# Patient Record
Sex: MALE | URBAN - METROPOLITAN AREA
[De-identification: names, ages, dates, MRNs, and addresses within clinical notes are randomized per-mention and may not be internally consistent; named-entity substitution may affect disease eponyms.]

---

## 2021-01-20 ENCOUNTER — EMERGENCY (EMERGENCY)
Facility: HOSPITAL | Age: 46
LOS: 0 days | Discharge: HOME | End: 2021-01-20
Attending: EMERGENCY MEDICINE | Admitting: EMERGENCY MEDICINE
Payer: OTHER MISCELLANEOUS

## 2021-01-20 VITALS
HEIGHT: 74 IN | RESPIRATION RATE: 20 BRPM | HEART RATE: 78 BPM | DIASTOLIC BLOOD PRESSURE: 102 MMHG | TEMPERATURE: 98 F | OXYGEN SATURATION: 98 % | SYSTOLIC BLOOD PRESSURE: 166 MMHG | WEIGHT: 220.02 LBS

## 2021-01-20 VITALS — TEMPERATURE: 98 F
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
## 2021-01-20 DIAGNOSIS — Z98.890 OTHER SPECIFIED POSTPROCEDURAL STATES: Chronic | ICD-10-CM

## 2021-01-20 DIAGNOSIS — I25.10 ATHEROSCLEROTIC HEART DISEASE OF NATIVE CORONARY ARTERY WITHOUT ANGINA PECTORIS: ICD-10-CM

## 2021-01-20 DIAGNOSIS — S09.90XA UNSPECIFIED INJURY OF HEAD, INITIAL ENCOUNTER: ICD-10-CM
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
## 2021-01-20 DIAGNOSIS — Z91.013 ALLERGY TO SEAFOOD: ICD-10-CM

## 2021-01-20 DIAGNOSIS — R06.02 SHORTNESS OF BREATH: ICD-10-CM

## 2021-01-20 DIAGNOSIS — S00.01XA ABRASION OF SCALP, INITIAL ENCOUNTER: ICD-10-CM

## 2021-01-20 DIAGNOSIS — I10 ESSENTIAL (PRIMARY) HYPERTENSION: ICD-10-CM

## 2021-01-20 DIAGNOSIS — Z23 ENCOUNTER FOR IMMUNIZATION: ICD-10-CM
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
## 2021-01-20 DIAGNOSIS — E78.00 PURE HYPERCHOLESTEROLEMIA, UNSPECIFIED: ICD-10-CM

## 2021-01-20 DIAGNOSIS — E11.9 TYPE 2 DIABETES MELLITUS WITHOUT COMPLICATIONS: ICD-10-CM

## 2021-01-20 PROCEDURE — 70450 CT HEAD/BRAIN W/O DYE: CPT | Mod: 26

## 2021-01-20 PROCEDURE — 99284 EMERGENCY DEPT VISIT MOD MDM: CPT

## 2021-01-20 RX ORDER — ONDANSETRON 8 MG/1
4 TABLET, FILM COATED ORAL ONCE
Refills: 0 | Status: DISCONTINUED | OUTPATIENT
Start: 2021-01-20 | End: 2021-01-20

## 2021-01-20 RX ORDER — ACETAMINOPHEN 500 MG
975 TABLET ORAL ONCE
Refills: 0 | Status: COMPLETED | OUTPATIENT
Start: 2021-01-20 | End: 2021-01-20

## 2021-01-20 RX ORDER — ONDANSETRON 8 MG/1
4 TABLET, FILM COATED ORAL ONCE
Refills: 0 | Status: COMPLETED | OUTPATIENT
Start: 2021-01-20 | End: 2021-01-20

## 2021-01-20 RX ORDER — TETANUS TOXOID, REDUCED DIPHTHERIA TOXOID AND ACELLULAR PERTUSSIS VACCINE, ADSORBED 5; 2.5; 8; 8; 2.5 [IU]/.5ML; [IU]/.5ML; UG/.5ML; UG/.5ML; UG/.5ML
0.5 SUSPENSION INTRAMUSCULAR ONCE
Refills: 0 | Status: COMPLETED | OUTPATIENT
Start: 2021-01-20 | End: 2021-01-20

## 2021-01-20 RX ADMIN — ONDANSETRON 4 MILLIGRAM(S): 8 TABLET, FILM COATED ORAL at 10:51

## 2021-01-20 RX ADMIN — Medication 975 MILLIGRAM(S): at 10:51

## 2021-01-20 RX ADMIN — TETANUS TOXOID, REDUCED DIPHTHERIA TOXOID AND ACELLULAR PERTUSSIS VACCINE, ADSORBED 0.5 MILLILITER(S): 5; 2.5; 8; 8; 2.5 SUSPENSION INTRAMUSCULAR at 11:09

## 2021-01-20 NOTE — ED PROVIDER NOTE - ATTENDING CONTRIBUTION TO CARE
head injury on antiplatelets/aspirin, occurred at 8 am no loc mild nausea but no vomiting, no change in mental status. exam shows abrasion to scalp. cn 2-12 intact, gait nml, finger to nose.   plan is to obtain ct head and give zofran.

## 2021-01-20 NOTE — ED PROVIDER NOTE - PATIENT PORTAL LINK FT
You can access the FollowMyHealth Patient Portal offered by [facility] by registering at the following website: [email or web address]. By joining [employer]’s FollowMyHealth portal, you will also be able to view your health information using other applications (apps) compatible with our system.

## 2021-01-20 NOTE — ED PROVIDER NOTE - NS ED ROS FT
Review of Systems    Constitutional: (-) fever  Eyes/ENT: (-) blurry vision, (-) epistaxis  Cardiovascular: (-) chest pain, (-) syncope  Respiratory: (-) cough, (+) shortness of breath  Gastrointestinal: (-) vomiting, (-) diarrhea  Musculoskeletal: (-) neck pain, (-) back pain, (-) joint pain  Integumentary: (-) rash, (-) edema  Neurological: (-) headache, (-) altered mental status  Psychiatric: (-) hallucinations  Allergic/Immunologic: (-) pruritus

## 2021-01-20 NOTE — ED PROVIDER NOTE - NSFOLLOWUPCLINICS_GEN_ALL_ED_FT
[facility] Concussion Program  Concussion Program  [address] [place] [address]   Phone: [phone]  Fax:   Follow Up Time: 4-6 Days

## 2021-01-20 NOTE — ED PROVIDER NOTE - CARE PLAN
Principal Discharge DX:	Closed head injury, initial encounter  Secondary Diagnosis:	Nausea  Secondary Diagnosis:	Tetanus-diphtheria (Td) vaccination

## 2021-01-20 NOTE — ED PROVIDER NOTE - PHYSICAL EXAMINATION
Gen: Alert, NAD, well appearing  Head: NC, +mild swelling to head at site of injury, PERRL, EOMI, normal lids/conjunctiva  ENT: normal hearing, patent oropharynx without erythema/exudate, uvula midline  Neck: +supple, no midline tenderness  Pulm: Bilateral BS, normal resp effort, no wheeze/stridor/retractions  CV: RRR  Abd: soft, NT/ND  Skin: +abrasion to mid head at site of injury  Neuro: AAOx3, no sensory/motor deficits, GCS 15

## 2021-01-20 NOTE — ED PROVIDER NOTE - OBJECTIVE STATEMENT
Patient is a 45 years old M pmhx DM, HTN, Hypercholesterolemia, CAD w/ 1 stent presents to the ED for evaluation of head injury.  As per patient at around 8am this morning getting up and hit his head on a metal beam, no loc but admits to nausea and feeling dizzy.  No neck pain, no cp, no abd pain, hx of covid with now chronic shortness of breath but not new as of today. Pt is on ASA 81mg po daily.

## 2021-01-20 NOTE — ED PROVIDER NOTE - CLINICAL SUMMARY MEDICAL DECISION MAKING FREE TEXT BOX
PULMONARY FOLLOW UP      CHIEF COMPLAINT:     Chief Complaint   Patient presents with   • COPD     f/u copd- post CT        HISTORY OF PRESENT ILLNESS:     Marques Wills is a 65 year old male with a history of COPD, HTN, and hx of ptx who presents for further evaluation.  Doing well. Breathing has been stable. Has not requiring ED/UC visits. Using his Advair as prescribed.Not requiring his albuterol daily and has to use it rarely    Since my last visit with the patient -     []  YES    [x]  NO   cough:  []  YES    [x]  NO   purulent sputum:  []  YES    [x]  NO   hemoptysis:  []  YES    [x]  NO   Wheezing:  []  YES    [x]  NO   rhinorrhea/post nasal drip:  [x]  YES    []  NO   shortness of breath:   [x]  YES    []  NO   dyspnea on exertion:  []  YES    [x]  NO   chest pain:  []  YES    [x]  NO   pleurisy:  []  YES    [x]  NO   orthopnea:  []  YES    [x]  NO   paroxysmal nocturnal dyspnea:  []  YES    [x]  NO   lower extremity edema  []  YES    [x]  NO   fever:  []  YES    [x]  NO   chills:  []  YES    [x]  NO   night sweats:  []  YES    [x]  NO   GERD:  []  YES    [x]  NO   weight loss:  []  YES    [x]  NO   other:        Compliance:     Medications  [x]  YES    []  NO   Other:     REVIEW OF SYSTEMS:     The remainder of a 12 point review of systems was performed in clinic and is negative.     Patient's past medical history, past surgical history and social history were reviewed and are unchanged.     ALLERGIES:   Allergen Reactions   • Penicillins      rash        Current Outpatient Medications   Medication Sig   • Advair Diskus 250-50 MCG/DOSE inhaler Inhale 1 puff into the lungs 2 times daily.   • lisinopril (ZESTRIL) 5 MG tablet Take 1 tablet by mouth daily.   • albuterol 108 (90 Base) MCG/ACT inhaler Inhale 2 puffs into the lungs every 4 hours as needed for Shortness of Breath or Wheezing.   • atorvastatin (LIPITOR) 10 MG tablet Take 1 tablet by mouth daily.   • albuterol (VENTOLIN) (2.5 MG/3ML) 0.083%  nebulizer solution Take 3 mLs by nebulization every 4 hours as needed for Wheezing or Shortness of Breath. J44.9 COPD   • VITAMIN D, CHOLECALCIFEROL, PO Take 1 tablet by mouth daily.   • cetirizine (ZYRTEC) 10 MG tablet Take 10 mg by mouth daily.   • guaiFENesin (MUCINEX) 600 MG 12 hr tablet Take 600 mg by mouth nightly.   • aspirin 81 MG chewable tablet Chew 1 tablet by mouth daily.     No current facility-administered medications for this visit.           Tobacco Use: Quit          Packs/Day: 1     Years: 44         Quit date: 05/28/2016       Comment: 0.5 ppd prior to quitting in 2016      PHYSICAL EXAM:     Visit Vitals  /74   Pulse 70   Resp 16   Wt 114.9 kg   SpO2 98%   BMI 36.33 kg/m²      Constitutional:  Well developed, well nourished, no acute distress, non-toxic appearance. Well groomed.   Eyes:  PERRL, conjunctiva normal no scleral icterus, no lid lag or tosis  HENT:  Atraumatic, external ears normal, Uvula midline, oropharynx moist no oral lesions or thrush,   Respiratory:  No respiratory distress, decreased breath sounds, no rales, no wheezing. No accessory muscle use.  No conversational dyspnea.  No stridor. Trachea midline.  Cardiovascular:  Normal rate, normal rhythm, no murmurs, no gallops, no rubs. No peripheral edema  Musculoskeletal:   No clubbing, No cyanosis, Symmetric lower extremities bilaterally.    Integument:  No rashes, skin intact  Neurologic:  Alert & oriented x 3, CN 2-12 normal, normal gross upper and lower motor function  Psychiatric:  Speech and behavior appropriate.  Good insight. Orientated to person, place and time      LABORATORY     I have reviewed the pertinent laboratory tests.  These are the pertinent findings:    Recent Labs   Lab 09/28/20  0924   WBC 6.9   RBC 5.42   HGB 16.2   HCT 49.4   MCV 91.1      Absolute Neutrophils 4.3   Absolute Lymphocytes 1.7   Absolute Monocytes 0.7   Absolute Eosinophils  0.2   Absolute Basophils 0.0       Recent Labs   Lab  09/28/20  0924   Glucose 93   Sodium 138   Potassium 4.5   Chloride 106   BUN 14   Creatinine 1.22*   Calcium 8.5   Protein, Total 6.5   Albumin 3.9   GOT/AST 10   Alkaline Phosphatase 70   GPT 20   Globulin 2.6          IMAGING     I have reviewed the imaging study reports.  These are the pertinent findings:  CT lung cancer screening 8/7/20  EXAM:  CT CHEST LUNG CANCER SCREENING LOW DOSE WO CONTRAST     INDICATION: TOBACCO USE - ASYMPTOMATIC     COMPARISON: CT chest August 5, 2019, February 9, 2019, May 2016     TECHNIQUE:  Low-dose helical CT was acquired without intravenous contrast,  from lung apices to bases, with sagittal, coronal, MIP, and axial  reconstructions.     Radiation dose:  3.80 mGy; 148.90 mGy-cm.     FINDINGS:     Study Quality:  Good.     Pulmonary Nodules:   ---------------------------------  Nodule 1  Mean Axial Diameter: 4 mm.  Composition: Solid non-calcified.  Margin: Smooth.  Location: Right lower lobe.  Series/Image: 4/240.  Comparison: Unchanged.     Nodule 2  Mean Axial Diameter: 3 mm.  Composition: Solid non-calcified.  Margin: Smooth.  Location: Right lower lobe.  Series/Image: 4/207.  Comparison: Unchanged.     Nodule 3  Mean Axial Diameter: 5 mm.  Composition: Solid non-calcified.  Margin: Smooth.  Location: Right lower lobe.  Series/Image: 4/211.  Comparison: Unchanged.     There are a few scattered tiny punctate 2 mm which are not significantly  changed, for example, series 8 images 64, 34, and 118.  ---------------------------------     Lungs/Pleura: Previously described new left lower lobe nodules on the  August 5, 2020 have resolved. There is increasing atelectasis or scarring  in the left lower lobe. Stable lingular scarring.     There is adherent secretion along the right aspect of the trachea     Vessels: Aorta and main pulmonary artery are not significantly dilated.  Coronaries: Severe coronary calcification redemonstrated.     Other Findings: No concerning incidental  finding.           IMPRESSION:     Nodules: Stable scattered small pulmonary nodules, measuring up to 5 mm  right lower lobe. Resolution of previously seen left lower lobe nodules  Category: 2 - Benign.  Follow-up: Continued annual screening LOW-DOSE chest CT.  Imaging Options: CT Chest Lung Screening [[id number]]     Incidental Findings/Recommendations  Severe coronary artery calcification.        PULMONARY FUNCTION TEST      PFTs show moderately severe obstruction (GOLD2)  FINDINGS:  1.  The baseline spirometry is reproducible and appropriate for interpretation.  2.  The FEV1 is decreased at 58% predicted with an FVC of 102% predicted with an FEV1/FVC ratio of 42% predicted.  3.  There does not appear to be a significant bronchodilator response.  4.  The lung volumes show an elevated total lung capacity of 129% with an RV of 151%, consistent with hyperinflation.  5.  The DLCO is normal.    INTERPRETATION:  1.  Moderately severe obstruction on baseline spirometry with no significant bronchodilator response.  This should not preclude the use of bronchodilator if clinically indicated.  2.  Lung volumes consistent with hyperinflation from the patient's underlying obstructive lung disease.  3.  The DLCO is normal.  4.  There are no previous pulmonary function tests in our system for comparison.    ECHO & CARDIAC TESTING         PSG     · none      I have independently reviewed the patient's most recent chest imaging to include chest xrays as well as any Chest CT scans.  I have also reviewed the patient's most recent laboratory results.   I have reviewed the patient's previous 6 months or greater of outpatient and inpatient records. To include pulmonary function testing, echocardiograms, and cardiac stress tests.     Health Maintenance   Topic Date Due   • Abdominal Aortic Aneurysm (AAA) Screening  04/24/2019   • Colonoscopy Risk  12/10/2020   • Lung Cancer Screening  08/07/2021   • Depression Screening  09/28/2021   •  Medicare Wellness Visit  10/01/2021   • DTaP/Tdap/Td Vaccine (3 - Td) 09/18/2028   • Influenza Vaccine  Completed   • Hepatitis C Screening  Completed   • Shingles Vaccine  Completed   • Pneumococcal Vaccine 65+  Completed   • Meningococcal Vaccine  Aged Out   • HPV Vaccine  Aged Out         ASSESSMENT AND PLAN:      65 year old male with a history of COPD, HTN, , and hx of ptx here for F/U. Currently symptoms are stable. Continue with current inhalers. Reviewed his CT scan today and we discussed need for continued annual screening.     1. Personal history of tobacco use    2. COPD with acute exacerbation (CMS/HCC)    3. Pulmonary nodules/lesions, multiple    4. Seasonal allergic rhinitis due to pollen    5. Smoking    6. History of pneumothorax       -- c/w current inhalers (advair and prn albuterol)  -- needs repeat CT scan for his pulmonary nodule in August of next year 21 (order placed)  -- up to date on flu vaccine.     Orders Placed This Encounter   • CT Lung Cancer Screening Low Dose WO Contrast        I have spent greater then 50% of this visit counseling and/or coordinating care for the patient. Total time spent = 25 minutes    Brice Manning MD  Pulmonary & Critical Care Medicine  Pager: [phone]   evaluated for head injury ct head obtained which did not reveal acute bleed or fx. symptoms resolved.
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
## 2021-01-20 NOTE — ED PROVIDER NOTE - PROGRESS NOTE DETAILS
mild nausea will order CT head give zofran and observe patient sx improved CT negative follow up with pmd for work clearance

## 2021-08-23 NOTE — ED ADULT NURSE NOTE - CAS DISCH BELONGINGS RETURNED
08/23/21         We would like to thank you for completing the bariatric surgery online seminar. We are currently reviewing your insurance benefits and eligibility for bariatric surgery. We will contact you in the next several weeks to discuss your insurance benefits and eligibility. Along with BMI (> 35) and co-morbidity (diabetes, hypertension, obstructive sleep apnea etc) eligibility guidelines the following list notes our program eligibility guidelines for bariatric surgery:    1. Patients will be free from smoking/vaping or use illicit drug (including CBD products) use for a period of 3 months prior to starting or resuming the bariatric surgery program. Testing for nicotine and drugs will occur.  2. Patients will be 1 year free from inpatient psychiatric hospitalization and have the support from their current mental health providers to begin or resume the bariatric surgery program.  3. Patients with a BMI of greater than 40 will lose 5% of their body weight prior to bariatric surgery.   4. Patients will have a minimum of 3 appointments with the bariatric surgery program registered dietitian prior to bariatric surgery. Patients will attend these appointments in-person or have an in-clinic weight check at a minimum interval of every other month.   5. Patients will have an assessment with a bariatric surgery program psychologist with possible follow up appointment to meet any identified psychological requirements.  6. Patients for whom is has been recommended to utilize a CPAP, BiPAP - will demonstrate adequate usage prior to bariatric surgery.   7. Patients with diabetes will have the goal of an A1C level below 9.0.    We are excited that you are considering this lifelong journey toward better health.       [facility], 4th Floor  [address] [facility] [address]  [facility] [address]  Phone: [phone]  Fax: [phone]    
Not applicable

## 2022-07-19 VITALS
HEART RATE: 93 BPM | RESPIRATION RATE: 18 BRPM | WEIGHT: 197.09 LBS | OXYGEN SATURATION: 95 % | TEMPERATURE: 98 F | HEIGHT: 74 IN | SYSTOLIC BLOOD PRESSURE: 138 MMHG | DIASTOLIC BLOOD PRESSURE: 91 MMHG

## 2022-07-19 PROBLEM — E78.00 PURE HYPERCHOLESTEROLEMIA, UNSPECIFIED: Chronic | Status: ACTIVE | Noted: 2021-01-20

## 2022-07-19 PROBLEM — I10 ESSENTIAL (PRIMARY) HYPERTENSION: Chronic | Status: ACTIVE | Noted: 2021-01-20

## 2022-07-19 PROBLEM — E11.9 TYPE 2 DIABETES MELLITUS WITHOUT COMPLICATIONS: Chronic | Status: ACTIVE | Noted: 2021-01-20

## 2022-07-19 RX ORDER — CHLORHEXIDINE GLUCONATE 213 G/1000ML
1 SOLUTION TOPICAL ONCE
Refills: 0 | Status: DISCONTINUED | OUTPATIENT
Start: 2022-07-22 | End: 2022-08-05
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
## 2022-07-19 NOTE — H&P ADULT - NSHPSOCIALHISTORY_GEN_ALL_CORE
all other ROS negative except as per HPI Former smoker quit 10 years ago   ETOH:?  Drugs:?? Former smoker quit 10 years ago   ETOH: None  Drugs: None6 Former smoker quit 10 years ago   ETOH: None  Drugs: None

## 2022-07-19 NOTE — H&P ADULT - NSHPLABSRESULTS_GEN_ALL_CORE
ECG:                         15.7   8.83  )-----------( 291      ( 22 Jul 2022 07:37 )             47.4       07-22    141  |  103  |  15  ----------------------------<  151<H>  4.0   |  22  |  1.00    Ca    9.7      22 Jul 2022 07:46    TPro  7.7  /  Alb  4.6  /  TBili  0.5  /  DBili  x   /  AST  16  /  ALT  15  /  AlkPhos  87  07-22      PT/INR - ( 22 Jul 2022 07:37 )   PT: 10.9 sec;   INR: 0.92          PTT - ( 22 Jul 2022 07:37 )  PTT:33.7 sec    CARDIAC MARKERS ( 22 Jul 2022 07:46 )  x     / x     / 82 U/L / x     / 1.5 ng/mL ECG: NSR @ 93bpm, no STT changes                         15.7   8.83  )-----------( 291      ( 22 Jul 2022 07:37 )             47.4       07-22    141  |  103  |  15  ----------------------------<  151<H>  4.0   |  22  |  1.00    Ca    9.7      22 Jul 2022 07:46    TPro  7.7  /  Alb  4.6  /  TBili  0.5  /  DBili  x   /  AST  16  /  ALT  15  /  AlkPhos  87  07-22      PT/INR - ( 22 Jul 2022 07:37 )   PT: 10.9 sec;   INR: 0.92          PTT - ( 22 Jul 2022 07:37 )  PTT:33.7 sec    CARDIAC MARKERS ( 22 Jul 2022 07:46 )  x     / x     / 82 U/L / x     / 1.5 ng/mL

## 2022-07-19 NOTE — H&P ADULT - MUSCULOSKELETAL
Reviewed initial LibrePro download. -Trends: V. High: 1%, High: 7%, Target: 90%, Low: 2%   -Trend towards lows (70s) while sleeping ~6AM and before lunch   -Infrequent post-prandial spikes    Current Diabetes Pharmacotherapy:   Metformin 1000mg BID  Victoza 1.2mg daily  Levemir 18 units QHS  Pioglitazone 15mg daily    Plan:  Recommend decreasing to Levemir 16 units at bedtime for remainder of Freestyle Sy trial    Please message back with your response to the above recommendation.       Thank you,    David Su, PharmD, [facility]  Internal Medicine Clinical Pharmacist  [phone] normal/ROM intact/normal gait/strength 5/5 bilateral upper extremities/strength 5/5 bilateral lower extremities

## 2022-07-19 NOTE — H&P ADULT - NSICDXFAMILYHX_GEN_ALL_CORE_FT
FAMILY HISTORY:  Mother  Still living? Yes, Estimated age: Age Unknown  FH: HTN (hypertension), Age at diagnosis: Age Unknown  FH: hypercholesterolemia, Age at diagnosis: Age Unknown
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
## 2022-07-22 ENCOUNTER — OUTPATIENT (OUTPATIENT)
Dept: OUTPATIENT SERVICES | Facility: HOSPITAL | Age: 47
LOS: 1 days | Discharge: HOME | End: 2022-07-22
Payer: COMMERCIAL

## 2022-07-22 DIAGNOSIS — S09.93XS UNSPECIFIED INJURY OF FACE, SEQUELA: Chronic | ICD-10-CM

## 2022-07-22 DIAGNOSIS — Z98.890 OTHER SPECIFIED POSTPROCEDURAL STATES: Chronic | ICD-10-CM

## 2022-07-22 LAB
A1C WITH ESTIMATED AVERAGE GLUCOSE RESULT: 6 % — HIGH (ref 4–5.6)
ALBUMIN SERPL ELPH-MCNC: 4.6 G/DL — SIGNIFICANT CHANGE UP (ref 3.3–5)
ALP SERPL-CCNC: 87 U/L — SIGNIFICANT CHANGE UP (ref 40–120)
ALT FLD-CCNC: 15 U/L — SIGNIFICANT CHANGE UP (ref 10–45)
ANION GAP SERPL CALC-SCNC: 14 MMOL/L — SIGNIFICANT CHANGE UP (ref 5–17)
ANION GAP SERPL CALC-SCNC: 16 MMOL/L — SIGNIFICANT CHANGE UP (ref 5–17)
APTT BLD: 33.7 SEC — SIGNIFICANT CHANGE UP (ref 27.5–35.5)
AST SERPL-CCNC: 16 U/L — SIGNIFICANT CHANGE UP (ref 10–40)
BASOPHILS # BLD AUTO: 0.03 K/UL — SIGNIFICANT CHANGE UP (ref 0–0.2)
BASOPHILS NFR BLD AUTO: 0.3 % — SIGNIFICANT CHANGE UP (ref 0–2)
BILIRUB SERPL-MCNC: 0.5 MG/DL — SIGNIFICANT CHANGE UP (ref 0.2–1.2)
BUN SERPL-MCNC: 15 MG/DL — SIGNIFICANT CHANGE UP (ref 7–23)
BUN SERPL-MCNC: 15 MG/DL — SIGNIFICANT CHANGE UP (ref 7–23)
CALCIUM SERPL-MCNC: 9.2 MG/DL — SIGNIFICANT CHANGE UP (ref 8.4–10.5)
CALCIUM SERPL-MCNC: 9.7 MG/DL — SIGNIFICANT CHANGE UP (ref 8.4–10.5)
CHLORIDE SERPL-SCNC: 103 MMOL/L — SIGNIFICANT CHANGE UP (ref 96–108)
CHLORIDE SERPL-SCNC: 106 MMOL/L — SIGNIFICANT CHANGE UP (ref 96–108)
CHOLEST SERPL-MCNC: 194 MG/DL — SIGNIFICANT CHANGE UP
CK MB CFR SERPL CALC: 1.5 NG/ML — SIGNIFICANT CHANGE UP (ref 0–6.7)
CK SERPL-CCNC: 82 U/L — SIGNIFICANT CHANGE UP (ref 30–200)
CO2 SERPL-SCNC: 20 MMOL/L — LOW (ref 22–31)
CO2 SERPL-SCNC: 22 MMOL/L — SIGNIFICANT CHANGE UP (ref 22–31)
CREAT SERPL-MCNC: 1 MG/DL — SIGNIFICANT CHANGE UP (ref 0.5–1.3)
CREAT SERPL-MCNC: 1.04 MG/DL — SIGNIFICANT CHANGE UP (ref 0.5–1.3)
EGFR: 89 ML/MIN/1.73M2 — SIGNIFICANT CHANGE UP
EGFR: 93 ML/MIN/1.73M2 — SIGNIFICANT CHANGE UP
EOSINOPHIL # BLD AUTO: 0.13 K/UL — SIGNIFICANT CHANGE UP (ref 0–0.5)
EOSINOPHIL NFR BLD AUTO: 1.5 % — SIGNIFICANT CHANGE UP (ref 0–6)
ESTIMATED AVERAGE GLUCOSE: 126 MG/DL — HIGH (ref 68–114)
GLUCOSE BLDC GLUCOMTR-MCNC: 117 MG/DL — HIGH (ref 70–99)
GLUCOSE BLDC GLUCOMTR-MCNC: 141 MG/DL — HIGH (ref 70–99)
GLUCOSE SERPL-MCNC: 151 MG/DL — HIGH (ref 70–99)
GLUCOSE SERPL-MCNC: 209 MG/DL — HIGH (ref 70–99)
HCT VFR BLD CALC: 43.5 % — SIGNIFICANT CHANGE UP (ref 39–50)
HCT VFR BLD CALC: 47.4 % — SIGNIFICANT CHANGE UP (ref 39–50)
HDLC SERPL-MCNC: 41 MG/DL — SIGNIFICANT CHANGE UP
HGB BLD-MCNC: 14.5 G/DL — SIGNIFICANT CHANGE UP (ref 13–17)
HGB BLD-MCNC: 15.7 G/DL — SIGNIFICANT CHANGE UP (ref 13–17)
IMM GRANULOCYTES NFR BLD AUTO: 0.3 % — SIGNIFICANT CHANGE UP (ref 0–1.5)
INR BLD: 0.92 — SIGNIFICANT CHANGE UP (ref 0.88–1.16)
ISTAT ACTK (ACTIVATED CLOTTING TIME KAOLIN): 283 SEC — HIGH (ref 74–137)
LIPID PNL WITH DIRECT LDL SERPL: 107 MG/DL — HIGH
LYMPHOCYTES # BLD AUTO: 2.14 K/UL — SIGNIFICANT CHANGE UP (ref 1–3.3)
LYMPHOCYTES # BLD AUTO: 24.2 % — SIGNIFICANT CHANGE UP (ref 13–44)
MAGNESIUM SERPL-MCNC: 2 MG/DL — SIGNIFICANT CHANGE UP (ref 1.6–2.6)
MCHC RBC-ENTMCNC: 29 PG — SIGNIFICANT CHANGE UP (ref 27–34)
MCHC RBC-ENTMCNC: 29.1 PG — SIGNIFICANT CHANGE UP (ref 27–34)
MCHC RBC-ENTMCNC: 33.1 GM/DL — SIGNIFICANT CHANGE UP (ref 32–36)
MCHC RBC-ENTMCNC: 33.3 GM/DL — SIGNIFICANT CHANGE UP (ref 32–36)
MCV RBC AUTO: 87.2 FL — SIGNIFICANT CHANGE UP (ref 80–100)
MCV RBC AUTO: 87.5 FL — SIGNIFICANT CHANGE UP (ref 80–100)
MONOCYTES # BLD AUTO: 0.7 K/UL — SIGNIFICANT CHANGE UP (ref 0–0.9)
MONOCYTES NFR BLD AUTO: 7.9 % — SIGNIFICANT CHANGE UP (ref 2–14)
NEUTROPHILS # BLD AUTO: 5.8 K/UL — SIGNIFICANT CHANGE UP (ref 1.8–7.4)
NEUTROPHILS NFR BLD AUTO: 65.8 % — SIGNIFICANT CHANGE UP (ref 43–77)
NON HDL CHOLESTEROL: 153 MG/DL — HIGH
NRBC # BLD: 0 /100 WBCS — SIGNIFICANT CHANGE UP (ref 0–0)
NRBC # BLD: 0 /100 WBCS — SIGNIFICANT CHANGE UP (ref 0–0)
PLATELET # BLD AUTO: 253 K/UL — SIGNIFICANT CHANGE UP (ref 150–400)
PLATELET # BLD AUTO: 291 K/UL — SIGNIFICANT CHANGE UP (ref 150–400)
POCT ISTAT CREATININE: 0.9 MG/DL — SIGNIFICANT CHANGE UP (ref 0.5–1.3)
POTASSIUM SERPL-MCNC: 4 MMOL/L — SIGNIFICANT CHANGE UP (ref 3.5–5.3)
POTASSIUM SERPL-MCNC: 4.4 MMOL/L — SIGNIFICANT CHANGE UP (ref 3.5–5.3)
POTASSIUM SERPL-SCNC: 4 MMOL/L — SIGNIFICANT CHANGE UP (ref 3.5–5.3)
POTASSIUM SERPL-SCNC: 4.4 MMOL/L — SIGNIFICANT CHANGE UP (ref 3.5–5.3)
PROT SERPL-MCNC: 7.7 G/DL — SIGNIFICANT CHANGE UP (ref 6–8.3)
PROTHROM AB SERPL-ACNC: 10.9 SEC — SIGNIFICANT CHANGE UP (ref 10.5–13.4)
RBC # BLD: 4.99 M/UL — SIGNIFICANT CHANGE UP (ref 4.2–5.8)
RBC # BLD: 5.42 M/UL — SIGNIFICANT CHANGE UP (ref 4.2–5.8)
RBC # FLD: 12.7 % — SIGNIFICANT CHANGE UP (ref 10.3–14.5)
RBC # FLD: 12.8 % — SIGNIFICANT CHANGE UP (ref 10.3–14.5)
SODIUM SERPL-SCNC: 140 MMOL/L — SIGNIFICANT CHANGE UP (ref 135–145)
SODIUM SERPL-SCNC: 141 MMOL/L — SIGNIFICANT CHANGE UP (ref 135–145)
TRIGL SERPL-MCNC: 228 MG/DL — HIGH
WBC # BLD: 8.16 K/UL — SIGNIFICANT CHANGE UP (ref 3.8–10.5)
WBC # BLD: 8.83 K/UL — SIGNIFICANT CHANGE UP (ref 3.8–10.5)
WBC # FLD AUTO: 8.16 K/UL — SIGNIFICANT CHANGE UP (ref 3.8–10.5)
WBC # FLD AUTO: 8.83 K/UL — SIGNIFICANT CHANGE UP (ref 3.8–10.5)

## 2022-07-22 PROCEDURE — 93458 L HRT ARTERY/VENTRICLE ANGIO: CPT | Mod: 26,59

## 2022-07-22 PROCEDURE — 82550 ASSAY OF CK (CPK): CPT

## 2022-07-22 PROCEDURE — 82565 ASSAY OF CREATININE: CPT

## 2022-07-22 PROCEDURE — 93458 L HRT ARTERY/VENTRICLE ANGIO: CPT | Mod: 59

## 2022-07-22 PROCEDURE — 80053 COMPREHEN METABOLIC PANEL: CPT

## 2022-07-22 PROCEDURE — C1874: CPT

## 2022-07-22 PROCEDURE — 36415 COLL VENOUS BLD VENIPUNCTURE: CPT

## 2022-07-22 PROCEDURE — 80061 LIPID PANEL: CPT

## 2022-07-22 PROCEDURE — 99152 MOD SED SAME PHYS/QHP 5/>YRS: CPT

## 2022-07-22 PROCEDURE — 85347 COAGULATION TIME ACTIVATED: CPT

## 2022-07-22 PROCEDURE — C1753: CPT

## 2022-07-22 PROCEDURE — 92928 PRQ TCAT PLMT NTRAC ST 1 LES: CPT | Mod: LC

## 2022-07-22 PROCEDURE — C9600: CPT | Mod: LC

## 2022-07-22 PROCEDURE — 82962 GLUCOSE BLOOD TEST: CPT

## 2022-07-22 PROCEDURE — C1769: CPT

## 2022-07-22 PROCEDURE — 82553 CREATINE MB FRACTION: CPT

## 2022-07-22 PROCEDURE — 85025 COMPLETE CBC W/AUTO DIFF WBC: CPT

## 2022-07-22 PROCEDURE — 85610 PROTHROMBIN TIME: CPT

## 2022-07-22 PROCEDURE — C1894: CPT

## 2022-07-22 PROCEDURE — 85027 COMPLETE CBC AUTOMATED: CPT

## 2022-07-22 PROCEDURE — 83735 ASSAY OF MAGNESIUM: CPT

## 2022-07-22 PROCEDURE — 85730 THROMBOPLASTIN TIME PARTIAL: CPT

## 2022-07-22 PROCEDURE — C1725: CPT

## 2022-07-22 PROCEDURE — 93010 ELECTROCARDIOGRAM REPORT: CPT

## 2022-07-22 PROCEDURE — 92978 ENDOLUMINL IVUS OCT C 1ST: CPT | Mod: 26,LC

## 2022-07-22 PROCEDURE — 92978 ENDOLUMINL IVUS OCT C 1ST: CPT | Mod: LC

## 2022-07-22 PROCEDURE — 93005 ELECTROCARDIOGRAM TRACING: CPT

## 2022-07-22 PROCEDURE — 83036 HEMOGLOBIN GLYCOSYLATED A1C: CPT

## 2022-07-22 PROCEDURE — 80048 BASIC METABOLIC PNL TOTAL CA: CPT

## 2022-07-22 PROCEDURE — C1887: CPT

## 2022-07-22 RX ORDER — CLOPIDOGREL BISULFATE 75 MG/1
1 TABLET, FILM COATED ORAL
Qty: 90 | Refills: 4
Start: 2022-07-22 | End: 2023-10-14

## 2022-07-22 RX ORDER — SODIUM CHLORIDE 9 MG/ML
1000 INJECTION, SOLUTION INTRAVENOUS
Refills: 0 | Status: DISCONTINUED | OUTPATIENT
Start: 2022-07-22 | End: 2022-08-05

## 2022-07-22 RX ORDER — HYDROCORTISONE 20 MG
100 TABLET ORAL ONCE
Refills: 0 | Status: COMPLETED | OUTPATIENT
Start: 2022-07-22 | End: 2022-07-22

## 2022-07-22 RX ORDER — ICOSAPENT ETHYL 500 MG/1
2 CAPSULE, LIQUID FILLED ORAL
Qty: 0 | Refills: 0 | DISCHARGE

## 2022-07-22 RX ORDER — CLOPIDOGREL BISULFATE 75 MG/1
600 TABLET, FILM COATED ORAL ONCE
Refills: 0 | Status: COMPLETED | OUTPATIENT
Start: 2022-07-22 | End: 2022-07-22

## 2022-07-22 RX ORDER — PREGABALIN 225 MG/1
2000 CAPSULE ORAL
Qty: 0 | Refills: 0 | DISCHARGE

## 2022-07-22 RX ORDER — SODIUM CHLORIDE 9 MG/ML
250 INJECTION INTRAMUSCULAR; INTRAVENOUS; SUBCUTANEOUS ONCE
Refills: 0 | Status: DISCONTINUED | OUTPATIENT
Start: 2022-07-22 | End: 2022-07-22

## 2022-07-22 RX ORDER — ERGOCALCIFEROL 1.25 MG/1
1 CAPSULE ORAL
Qty: 0 | Refills: 0 | DISCHARGE

## 2022-07-22 RX ORDER — EMPAGLIFLOZIN 10 MG/1
1 TABLET, FILM COATED ORAL
Qty: 0 | Refills: 0 | DISCHARGE

## 2022-07-22 RX ORDER — ASPIRIN/CALCIUM CARB/MAGNESIUM 324 MG
1 TABLET ORAL
Qty: 0 | Refills: 0 | DISCHARGE

## 2022-07-22 RX ORDER — LISINOPRIL 2.5 MG/1
0 TABLET ORAL
Qty: 0 | Refills: 0 | DISCHARGE

## 2022-07-22 RX ORDER — METFORMIN HYDROCHLORIDE 850 MG/1
1 TABLET ORAL
Qty: 0 | Refills: 0 | DISCHARGE

## 2022-07-22 RX ORDER — ASPIRIN/CALCIUM CARB/MAGNESIUM 324 MG
81 TABLET ORAL ONCE
Refills: 0 | Status: COMPLETED | OUTPATIENT
Start: 2022-07-22 | End: 2022-07-22

## 2022-07-22 RX ORDER — DEXTROSE 50 % IN WATER 50 %
12.5 SYRINGE (ML) INTRAVENOUS ONCE
Refills: 0 | Status: DISCONTINUED | OUTPATIENT
Start: 2022-07-22 | End: 2022-08-05

## 2022-07-22 RX ORDER — DEXTROSE 50 % IN WATER 50 %
25 SYRINGE (ML) INTRAVENOUS ONCE
Refills: 0 | Status: DISCONTINUED | OUTPATIENT
Start: 2022-07-22 | End: 2022-08-05

## 2022-07-22 RX ORDER — SODIUM CHLORIDE 9 MG/ML
500 INJECTION INTRAMUSCULAR; INTRAVENOUS; SUBCUTANEOUS
Refills: 0 | Status: DISCONTINUED | OUTPATIENT
Start: 2022-07-22 | End: 2022-08-05

## 2022-07-22 RX ORDER — ASPIRIN/CALCIUM CARB/MAGNESIUM 324 MG
1 TABLET ORAL
Qty: 90 | Refills: 4
Start: 2022-07-22 | End: 2023-10-14

## 2022-07-22 RX ORDER — DEXTROSE 50 % IN WATER 50 %
15 SYRINGE (ML) INTRAVENOUS ONCE
Refills: 0 | Status: DISCONTINUED | OUTPATIENT
Start: 2022-07-22 | End: 2022-08-05

## 2022-07-22 RX ORDER — MULTIVIT-MIN/FERROUS GLUCONATE 9 MG/15 ML
0 LIQUID (ML) ORAL
Qty: 0 | Refills: 0 | DISCHARGE

## 2022-07-22 RX ORDER — GLUCAGON INJECTION, SOLUTION 0.5 MG/.1ML
1 INJECTION, SOLUTION SUBCUTANEOUS ONCE
Refills: 0 | Status: DISCONTINUED | OUTPATIENT
Start: 2022-07-22 | End: 2022-08-05

## 2022-07-22 RX ORDER — SODIUM CHLORIDE 9 MG/ML
500 INJECTION INTRAMUSCULAR; INTRAVENOUS; SUBCUTANEOUS
Refills: 0 | Status: DISCONTINUED | OUTPATIENT
Start: 2022-07-22 | End: 2022-07-22

## 2022-07-22 RX ORDER — DIPHENHYDRAMINE HCL 50 MG
50 CAPSULE ORAL ONCE
Refills: 0 | Status: DISCONTINUED | OUTPATIENT
Start: 2022-07-22 | End: 2022-08-05

## 2022-07-22 RX ORDER — REPAGLINIDE 1 MG/1
1 TABLET ORAL
Qty: 0 | Refills: 0 | DISCHARGE

## 2022-07-22 RX ORDER — SEMAGLUTIDE 0.68 MG/ML
1 INJECTION, SOLUTION SUBCUTANEOUS
Qty: 0 | Refills: 0 | DISCHARGE

## 2022-07-22 RX ORDER — ASPIRIN/CALCIUM CARB/MAGNESIUM 324 MG
0 TABLET ORAL
Qty: 0 | Refills: 0 | DISCHARGE

## 2022-07-22 RX ORDER — ROSUVASTATIN CALCIUM 5 MG/1
0 TABLET ORAL
Qty: 0 | Refills: 0 | DISCHARGE

## 2022-07-22 RX ORDER — ROSUVASTATIN CALCIUM 5 MG/1
1 TABLET ORAL
Qty: 0 | Refills: 0 | DISCHARGE

## 2022-07-22 RX ORDER — INSULIN LISPRO 100/ML
VIAL (ML) SUBCUTANEOUS ONCE
Refills: 0 | Status: DISCONTINUED | OUTPATIENT
Start: 2022-07-22 | End: 2022-08-05

## 2022-07-22 RX ADMIN — Medication 100 MILLIGRAM(S): at 08:52

## 2022-07-22 RX ADMIN — CLOPIDOGREL BISULFATE 600 MILLIGRAM(S): 75 TABLET, FILM COATED ORAL at 08:39

## 2022-07-22 RX ADMIN — Medication 81 MILLIGRAM(S): at 08:39
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
## 2022-07-22 NOTE — CHART NOTE - NSCHARTNOTEFT_GEN_A_CORE
Interventional Cardiology Radial band Removal Note    s/p Heparin 			s/p Angiomax    Pt without complaints.  VSS.    Right Radial access site TR Hemoband in place, no hematoma, no bleed  Radial pulse: 2+    Hemostasis achieved with manual release of hemoband.    _no___  Vasovagal reaction.    Meds given: none    Right Radial access site  no hematoma, no bleed  Radial pulse: 2+ R hand edema > L hand edema, soft, nontender    A/P:  s/p Dx Coronary Angiogram/FFR/IVUS/PTCA/Stent ([place] one)  -	continue to monitor  -	-OOB as tolerated  -	Post Procedure Instructions given  -                   Call [phone] if any access site issues.

## 2022-07-25 DIAGNOSIS — I25.110 ATHEROSCLEROTIC HEART DISEASE OF NATIVE CORONARY ARTERY WITH UNSTABLE ANGINA PECTORIS: ICD-10-CM

## 2022-07-25 DIAGNOSIS — I10 ESSENTIAL (PRIMARY) HYPERTENSION: ICD-10-CM

## 2022-07-25 DIAGNOSIS — R94.39 ABNORMAL RESULT OF OTHER CARDIOVASCULAR FUNCTION STUDY: ICD-10-CM

## 2022-09-15 NOTE — H&P ADULT - PSYCHIATRIC
"Miryam is a 64 year old who is being evaluated via a billable video visit.      If the video visit is dropped, the invitation should be resent by: Text to cell phone: [phone]  Will anyone else be joining your video visit? No      Video-Visit Details    Type of service:  Video Visit    Video Start Time: 1:42    Video End Time: 2:31    55 minutes spent on the date of the encounter doing chart review, review of test results, patient visit and documentation       Originating Location (pt. Location): Home    Distant Location (provider location):  [facility] [place]     Platform used for Video Visit: [employer] Medical Weight Management Consult      PATIENT:  Miryam Hoang  MRN:         [id number]  :         1958  ALLYSON:         9/15/2022        Dear Miryam Guerra MD,    I had the pleasure of seeing your patient, Miryam Hoang. Full intake/assessment was done to determine barriers to weight loss success and develop a treatment plan. Miryam Hoang is a 64 year old female interested in treatment of medical problems associated with excess weight. She has a height of 5' 10\"[pt reproted[, a weight of 274 lbs 0 oz, and the calculated Body mass index is 39.31 kg/m .     Looking to having hip surgery. It was highly recommended that she have a BMI of 38 before having the surgery.  Has had some success with weight watchers about 25 years ago.       ASSESSMENT/PLAN:  Class 2 severe obesity due to excess calories with Body Mass Index (BMI) of 39  Essential Hypertension  Hip pain        Goals:  Swim at [employer] 3 times weekly for 20 minutes each.    Increase water to 60 oz daily  Get lab drawn or sent over      We discussed the role of pharmacological agents in the treatment of obesity and the \"off-label\" use of medications in this practice. We discussed the risks and benefits of each. We discussed indications, contraindications, potential side effects, and " estimated costs of each. Discussed that medications must always be used together with lifestyle changes such as improvements in diet choices, portion control and establishing and maintaining a regular exercise program.      Patient had A1C drawn at [facility] and will have results sent over.  Lab was still placed in [place]. Depending on results can consider GLP-1.    Discussed metformin and low dose topamax. Reviewed side effects of topamax and patient information provided.  Patient will reach out to her primary regarding her aspirin use and topamax.  She was also advised to watch out for drowsiness. Prescribed topamax 25 mg daily. Emphasized the importance of increasing water intake.       PATIENT WILL SCHEDULE A NURSE ONLY APPOINTMENT AT A [place] CLINIC TO GET HEIGHT, WEIGHT, BLOOD PRESSURE AND PULSE TAKEN. THEY CAN THEN FORWARD RESULT OVER. PATIENT WILL GET DONE IN THE NEXT 1-2 WEEKS        Diet Recall     Wake Up: 5:30 am   16 oz coffee with creamer   Breakfast Miniature pumpkin scone from [employer] at 7:30.   Makes own shake around 10:30 am with yogurt along with a vegan muffin. Low carb   Lunch 5 miniature Naan breads with 3 roll ups of cheese and meat diet pepsi   Supper; Cup of broccoli cheddar soup, half deli turkey sandwich and kettle chips.  Diet pepsi   Bedtime:   Snack between meals:   Snack in evening:       Skinny pop popcorn 1.5 cups   4 pecans covered with dark chocolate and 6 peanuts with maple coating      Typical snacks:    Caloric beverages per day. What type: 40-45 oz water, coffee, 1-2 cans diet pepsi   Water consumed daily:    Low alexandrea/diet drinks:   Alcohol:   Foods you crave:       5 alcoholic beverages per month           Eating Habits (Yes/No) (Never, Daily, Several Days, Weekly,Monthly)   Generally, eat a lot of Simple Carbs Recently reduced   Generally, eat a lot of fatty Foods       Eat fast food        Eat Take out/sit-down restaurant. 1      Eat most meals in front of  screens       Skip meals yes   [place]razes all day No      Snacks between meals. Yes      Eat most food at end of day. Yes - heaviest meal at EOD      Eat in middle of night  No      Eat  to prevent or correct low blood sugar. No      Eat to prevent GERD No      Worry about not having enough food to eat./Food shelf No      Boredom Eat some      I emotionally eat. (stressed, depressed, anxious) No      I feel hungry all the time-even if I just have eaten.       Feeling full is important to me. No      I finish all the food on my plate-even if I am already full. No      I eat/snack without noticing that I am eating.       I eat when I am preparing the meal.       I have trouble not eating junk if around the house.       I think about food all day.       Who does the grocery shopping? No   Who does the cooking? No       Patient works full time in the office. Lives alone    Patient is not doing any purposeful exercise as of now. Joined [employer] yesterday.  Will start swimming with her sister     Mental Health History Reviewed With Patient    Does your mental health effects your weight or vice versa?   Weight effects mental health   How do you handle Stress?  What coping techniques do you use?      Do you see a therapist?      Would you like to be connected with a therapist?         ROS  General  Fatigue: yes   Sleep Quality: OK  Birth Control: 20 years postmenopausal  HEENT  Hx of glaucoma: No  Vision changes: No  Cardiovascular  Chest Pain with Exertion: No  Palpitations: No  Hx of heart disease: No  Hx of PVD No  Pulmonary  Shortness of breath at rest: No  Shortness of breath with exertion: No  Snoring: No idea  Gastrointestinal  Heartburn: No  Abdominal pain: No  History of pancreatitis: No  Severe constipation: No  Hx of bowel obstruction: No  Gastrourinary  History of kidney stones: No  Psychiatric  Moods Stable: Yes  History of drug abuse: No  No history of eating disorder  Endocrine  Polydipsia: No  Polyuria: No  Personal  "or family history of thyroid cancer: No  Neurologic:  Hx of seizures: No  Hx of paresthesias: No       MEDICATIONS:   Current Outpatient Medications   Medication Sig Dispense Refill     Ascorbic Acid (VITAMIN C) 500 MG CAPS See Admin Instructions       aspirin (ASA) 81 MG EC tablet Take 1 tablet by mouth       calcium-vitamin D (CALCIUM-VITAMIN D) 500 mg(1,250mg) -200 unit per tablet [CALCIUM-VITAMIN D (CALCIUM-VITAMIN D) 500 MG(1,250MG) -200 UNIT PER TABLET] Take 1 tablet by mouth 2 (two) times a day with meals.       Cholecalciferol (VITAMIN D3) 75 MCG (3000 UT) TABS 1 tab(s)       citalopram (CELEXA) 20 MG tablet [CITALOPRAM (CELEXA) 20 MG TABLET]        clobetasol (TEMOVATE) 0.05 % external solution [CLOBETASOL (TEMOVATE) 0.05 % EXTERNAL SOLUTION] APPLY TOPICALLY TO SCALP DAILY  6     ergocalciferol (ERGOCALCIFEROL) 1.25 MG (84395 UT) capsule Take 50,000 Units by mouth       fluocinolone (DERMA-SMOOTHE) 0.01 % external oil [FLUOCINOLONE (DERMA-SMOOTHE) 0.01 % EXTERNAL OIL]        gabapentin (NEURONTIN) 300 MG capsule Take 3 capsules (900 mg) by mouth 3 times daily Start taking this medication at bedtime and then follow chart of how I want you to increase this. 270 capsule 1     ibuprofen (ADVIL) 200 MG tablet [IBUPROFEN (ADVIL) 200 MG TABLET] Take 600 mg by mouth daily.       ketoconazole (NIZORAL) 2 % shampoo [KETOCONAZOLE (NIZORAL) 2 % SHAMPOO] APPLY AS DIRECTED TO SCALP 2-3 TIMES A WEEK AS NEEDED.  6     lisinopril (ZESTRIL) 10 MG tablet 1 tab(s)       multivit-minerals/ferrous fum (MULTI VITAMIN ORAL) [MULTIVIT-MINERALS/FERROUS FUM (MULTI VITAMIN ORAL)] Take by mouth.         ALLERGIES:   No Known Allergies         PHYSICAL EXAM:  Ht 5' 10\" (1.778 m)   Wt 274 lb (124.3 kg)   BMI 39.31 kg/m    GENERAL: Healthy, alert and no distress  EYES: Eyes grossly normal to inspection.  No discharge or erythema, or obvious scleral/conjunctival abnormalities.  RESP: No audible wheeze, cough, or visible cyanosis.  No " visible retractions or increased work of breathing.    SKIN: Visible skin clear. No significant rash, abnormal pigmentation or lesions.  NEURO: Cranial nerves grossly intact.  Mentation and speech appropriate for age.  PSYCH: Mentation appears normal, affect normal/bright, judgement and insight intact, normal speech and appearance well-groomed.          Sincerely,    Maldonado Hernández PA-C       normal/normal affect/alert and oriented x3/normal behavior normal/normal affect/alert and oriented x3/anxious